# Patient Record
(demographics unavailable — no encounter records)

---

## 2017-06-09 NOTE — ER DOCUMENT REPORT
ED Cardiac





- General


Mode of Arrival: Ambulatory


Information source: Patient


TRAVEL OUTSIDE OF THE [place] IN LAST 30 DAYS: No





- HPI


Patient complains to provider of: Chest pain, Chest tightness


Similar symptoms previously: Yes


Recently seen / treated by doctor: Yes





<CECIL NASH - Last Filed: 17 17:37>





<MARILEEKERENSHAWN - Last Filed: 06/10/17 01:17>





- General


Chief Complaint: Chest Pain


Stated Complaint: CHEST PAIN


Time Seen by Provider: 17 13:53


Notes: 


Patient is a 32 year old female presenting to the emergency department for 

chest pain. Patient's pain was onset yesterday while sitting at her son's 

graduation. Patient states she saw her PCP, Chu Barbour, who gave her ASA, 

Prilosec, and also did an EKG. Patient has an appointment to see a cardiologist 

on Monday. Patient's pain has continued today while she was at work today which 

was onset at 9:00. Patient states she experienced some tightness in her chest 

this morning which was new to . 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 

do any heavy lifting or hard labor. Patient denies any shortness of breath but 

states she has to take deep breaths during the episodes of chest pains. Patient 

states her episodes are waxing and waning and they last about 2-8 minutes; 

patient has had 4 episodes today prior to exam. Patient states she took ASA 

this morning. Patient still has chest pain at the time of the exam. Patient's 

father had an aortic aneurysm and hypertension; patient's mother has diabetes 

mellitus.


 (CECIL NASH)





- Related Data


Allergies/Adverse Reactions: 


 





No Known Allergies Allergy (Verified 17 13:34)


 











Past Medical History





- General


Information source: Patient





- Social History


Smoking Status: Current Every Day Smoker


Chew tobacco use (# tins/day): No


Frequency of alcohol use: None


Drug Abuse: None


Family History: DM - mom, Hypertension, Other - dad: aortic aneurysm rupture at 

age 50


Patient has suicidal ideation: No


Patient has homicidal ideation: No





- Medical History


Medical History: Negative


Past Surgical History: Reports: Hx Abdominal Surgery - laser Sx x3, Hx  

Section, Hx Hysterectomy, Hx Orthopedic Surgery - left wrist, back





<CECIL NASH - Last Filed: 17 17:37>





Review of Systems





- Review of Systems


Constitutional: No symptoms reported


EENT: No symptoms reported


Cardiovascular: See HPI, Chest pain


Respiratory: No symptoms reported


Gastrointestinal: No symptoms reported


Genitourinary: No symptoms reported


Female Genitourinary: No symptoms reported


Musculoskeletal: No symptoms reported


Skin: No symptoms reported


Hematologic/Lymphatic: No symptoms reported


Neurological/Psychological: No symptoms reported


-: Yes All other systems reviewed and negative





<CECIL NASH - Last Filed: 17 17:37>





Physical Exam





<CECIL NASH - Last Filed: 17 17:37>





<SHAWN HARPER - Last Filed: 06/10/17 01:17>





- Vital signs


Vitals: 


 











Temp Pulse Resp BP Pulse Ox


 


 98.0 F   90   16   128/92 H  98 


 


 17 13:34  17 13:34  17 13:34  17 13:34  17 13:34














- Notes


Notes: 


GENERAL: Alert, interacts well. No acute distress.


HEAD: Normocephalic, atraumatic.


EYES: Pupils equal, round, and reactive to light. Extraocular movements intact.


ENT: Oral mucosa moist, tongue midline. 


NECK: Full range of motion. Supple. Trachea midline.


LUNGS: Clear to auscultation bilaterally, no wheezes, rales, or rhonchi.  No 

reproducible chest wall tenderness to palpation.  No respiratory distress.


HEART: Regular rate and rhythm. No murmurs, gallops, or rubs.


ABDOMEN: Soft, non-tender, no epigastric tenderness to palpation. Non-

distended. Bowel sounds present in all 4 quadrants.


EXTREMITIES: Moves all 4 extremities spontaneously. No edema, radial and 

dorsalis pedis pulses 2/4 bilaterally. No cyanosis.


NEUROLOGICAL: Alert and oriented x3. Normal speech. 


PSYCH: Normal affect, normal mood.


SKIN: Warm, dry, normal turgor. No rashes or lesions noted.


 (CECIL NASH)





Course





- Laboratory


Result Diagrams: 


 17 14:20





 17 14:20





<CECIL NASH - Last Filed: 17 17:37>





- Laboratory


Result Diagrams: 


 17 14:20





 17 14:20





<SHAWN HARPER - Last Filed: 06/10/17 01:17>





- Re-evaluation


Re-evalutation: 


17 19:03


CBC shows slight leukocytosis 11.5, d-dimer negative which rules out pulmonary 

embolism however given the sharp and epigastric nature of her pain associated 

with the fact that her father had a ruptured AAA patient was sent for CAT scan 

of the chest abdomen and pelvis, this showed no evidence of pulmonary embolism, 

aortic dissection or aortic aneurysm.  Chemistries unremarkable, troponin 

negative 2.  EKG is non-ischemic.  Pregnancy test is negative.  Chest x-ray 

shows no acute process.  This pain is now resolved.











17 21:17


 (SHAWN HARPER)





- Vital Signs


Vital signs: 


 











Temp Pulse Resp BP Pulse Ox


 


 97.5 F   64   14   110/76   96 


 


 17 21:29  17 21:29  17 21:29  17 21:29  17 21:29














- Laboratory


Laboratory results interpreted by me: 


 











  17





  14:20


 


WBC  11.5 H














- EKG Interpretation by Me


Additional EKG results interpreted by me: 


17 19:04


17 19:04


Initial EKG shows sinus rhythm at a rate of 87, normal intervals, no ST segment 

elevations or depressions, there are isolated nonspecific T-wave inversions 

noted in lead III and V2 per my interpretation.





Repeat EKG shows sinus rhythm at a rate of 69, normal axis, normal intervals, 

no ST segment elevations or depressions, T-wave inversions are persistent in 

lead III but have normalized in V2 per my interpretation.





17 21:19


Final EKG after nitroglycerin with resolution of chest pain does not reveal any 

dynamic changes, reveals sinus rhythm at a rate of 68, persistent T-wave 

inversion in lead III, no ST segment elevations or depressions per my 

interpretation. (SHAWN HARPER)





Discharge





<CEICL NASH - Last Filed: 17 17:37>





<SHAWN HARPER - Last Filed: 06/10/17 01:17>





- Discharge


Clinical Impression: 


 Chest pain, moderate coronary artery risk





Condition: Stable


Disposition: HOME, SELF-CARE


Instructions:  Chest Pain of Unclear Cause ([facility])


Additional Instructions: 


This does not appear to be a heart attack.  You may still have some underlying 

heart disease.  It is very important that you follow-up with your cardiologist 

as scheduled on Monday.





Please continue taking your Prilosec as prescribed.  You may also use Tums.





Please return to the emergency department for any new or concerning symptoms.


Referrals: 


CHU BARBOUR PA-C [Primary Care Provider] - Follow up in 3-5 days


Scribe Attestation: 





06/10/17 01:17


I personally performed the services described in the documentation, reviewed 

and edited the documentation which was dictated to the scribe in my presence, 

and it accurately records my words and actions. (SHAWN HARPER)





Scribe Documentation





- Scribe


Written by Sam:: Sam Aragon, 17 17:50


acting as scribe for :: Rita





<CECIL NASH - Last Filed: 17 17:37>

## 2017-06-09 NOTE — RADIOLOGY REPORT (SQ)
EXAM DESCRIPTION:  CTA ABDOMEN/PELVIS W   WO



COMPLETED DATE/TIME:  6/9/2017 4:12 pm



REASON FOR STUDY:  epigastric pain, r/o AAA



COMPARISON:  None.



TECHNIQUE:  CT scan of the abdominal aorta extending to the iliac bifurcation performed with intraven
ous contrast using helical scanning technique with dynamic intravenous contrast injection. Images rev
iewed with lung, soft tissue, and bone windows. Reconstructed coronal and sagittal MPR images reviewe
d. All images stored on PACS.

Advanced 3D imaging as volume rendering, MIPS, SSD performed? yes

All CT scanners at this facility use dose modulation, iterative reconstruction, and/or weight based d
osing when appropriate to reduce radiation dose to as low as reasonably achievable (ALARA).

[facility]: Dose Right  [facility]: CareDose    [facility]: Dose Right    [facility]: Teradose 4D    [facility]: Smart Technologies



CONTRAST TYPE AND DOSE:  100 Isovue 370



RENAL FUNCTION:  Creatinine 0.72



LIMITATIONS:  Creatinine



FINDINGS:  POST-CONTRAST IMAGING:

AORTA AND VESSELS: No aneurysm. No dissection. Renal arteries, SMA, celiac without stenosis.

LUNG BASES: No significant findings. No nodules or infiltrates.

LIVER: Normal size. No masses or dilated ducts.

SPLEEN: Normal size. No focal lesions.

PANCREAS: No masses. No significant calcifications. No adjacent inflammation or peripancreatic fluid 
collections. Pancreatic duct not dilated.

GALLBLADDER: No identified stones by CT criteria. No inflammatory changes to suggest cholecystitis.

ADRENAL GLANDS: No significant masses or asymmetry.

RIGHT KIDNEY AND URETER: No mass, calculi or urinary tract obstruction.

LEFT KIDNEY AND URETER: No mass, calculi or urinary tract obstruction.

RETROPERITONEUM: No retroperitoneal adenopathy, hemorrhage or masses.

BOWEL AND PERITONEAL CAVITY: No masses or inflammatory changes. No free fluid or peritoneal masses.

APPENDIX: Normal.

ABDOMINAL WALL: No masses. No hernias.

BONY STRUCTURES: No significant or acute findings.

3-D IMAGING: Confirms the above findings.

OTHER: 2.5 cm in diameter right ovarian cyst is identified.  No other significant pelvic abnormalitie
s were identified.



IMPRESSION:  NO ABDOMINAL AORTIC ANEURYSM, DISSECTION OR SIGNIFICANT STENOSIS. NO SIGNIFICANT FINDING
S IN THE ABDOMEN or pelvis.



TECHNICAL DOCUMENTATION:  JOB ID:  [id number]

Quality ID # 436: Final reports with documentation of one or more dose reduction techniques (e.g., Au
tomated exposure control, adjustment of the mA and/or kV according to patient size, use of iterative 
reconstruction technique)

 2011 [facility] [place] [employer]- All Rights Reserved

## 2017-06-09 NOTE — RADIOLOGY REPORT (SQ)
EXAM DESCRIPTION:  CHEST SINGLE VIEW



COMPLETED DATE/TIME:  6/9/2017 2:42 pm



REASON FOR STUDY:  chest pain



COMPARISON:  11/17/2016.



EXAM PARAMETERS:  NUMBER OF VIEWS: One view.

TECHNIQUE: Single frontal radiographic view of the chest acquired.

RADIATION DOSE: NA

LIMITATIONS: None.



FINDINGS:  LUNGS AND PLEURA: No opacities, masses or pneumothorax. No pleural effusion.

MEDIASTINUM AND HILAR STRUCTURES: No masses.  Contour normal.

HEART AND VASCULAR STRUCTURES: Heart normal in size.  Normal vasculature.

BONES: No acute findings.

HARDWARE: None in the chest.

OTHER: No other significant finding.



IMPRESSION:  NO ACUTE RADIOGRAPHIC FINDING IN THE CHEST.



TECHNICAL DOCUMENTATION:  JOB ID:  [id number]

## 2017-06-09 NOTE — ER DOCUMENT REPORT
ED Medical Screen (RME)





- General


Chief Complaint: Chest Pain


Stated Complaint: CHEST PAIN


Time Seen by Provider: 06/09/17 13:53


Notes: 


32-year-old female patient complaining of midsternal chest pain started 

yesterday while sitting at her child's graduation.  Pain was sharp and stabbing

, lasting a couple of minutes and would come and go.  She saw her primary care 

provider and was referred to cardiology.  Today she developed a tight feeling 

and when the pain would hit her throat would also feel tight.  Again the pain 

lasts for a couple of minutes at a time.


Family history significant for father who had MI in his early 50s.  The patient 

is a heavy smoker.





I have greeted and performed a rapid initial assessment of this patient.  A 

comprehensive ED assessment and evaluation of the patient, analysis of test 

results and completion of the medical decision making process will be conducted 

by additional ED providers.


TRAVEL OUTSIDE OF THE [place] IN LAST 30 DAYS: No





- Related Data


Allergies/Adverse Reactions: 


 





No Known Allergies Allergy (Verified 06/09/17 13:34)


 











Past Medical History


Renal/ Medical History: Denies: Hx Peritoneal Dialysis





Physical Exam





- Vital signs


Vitals: 





 











Temp Pulse Resp BP Pulse Ox


 


 98.0 F   90   16   128/92 H  98 


 


 06/09/17 13:34  06/09/17 13:34  06/09/17 13:34  06/09/17 13:34  06/09/17 13:34














Course





- Vital Signs


Vital signs: 





 











Temp Pulse Resp BP Pulse Ox


 


 98.0 F   90   16   128/92 H  98 


 


 06/09/17 13:34  06/09/17 13:34  06/09/17 13:34  06/09/17 13:34  06/09/17 13:34

## 2017-06-09 NOTE — RADIOLOGY REPORT (SQ)
EXAM DESCRIPTION:  CTA CHEST



COMPLETED DATE/TIME:  6/9/2017 4:11 pm



REASON FOR STUDY:  stabbing epigastric pain, r/o AAA



COMPARISON:  None.



TECHNIQUE:  CT scan of the chest performed using helical scanning technique with dynamic intravenous 
contrast injection.  Images reviewed with lung, soft tissue and bone windows.  Reconstructed coronal 
and sagittal MPR images reviewed.

Additional 3 dimensional post-processing performed to develop Maximal Intensity Projection images (MI
P).  All images stored on PACS.

All CT scanners at this facility use dose modulation, iterative reconstruction, and/or weight based d
osing when appropriate to reduce radiation dose to as low as reasonably achievable (ALARA).

[facility]: Dose Right  [facility]: CareDose    [facility]: Dose Right    [facility]: Teradose 4D    [facility]: [employer]



CONTRAST TYPE AND DOSE:  100 Isovue 370



RENAL FUNCTION:  Creatinine 0.72



RADIATION DOSE:  32.65 mGy.



LIMITATIONS:  None.



FINDINGS:  LUNGS AND PLEURA: No masses, infiltrates, pneumothorax.  No pleural effusions, calcificati
ons.

AORTA AND GREAT VESSELS: No aneurysm or dissection.

HEART: No pericardial effusion.

PULMONARY ARTERIES: No emboli visualized in the main pulmonary arteries or the segmental branches.

HILAR AND MEDIASTINAL STRUCTURES: No identified masses or abnormal nodes.

HARDWARE: None in the chest.

UPPER ABDOMEN: See results under abdominal CT scan

THYROID AND OTHER SOFT TISSUES: No masses.  No adenopathy.

BONES: No acute or significant finding.

3D MIPS: Confirm above findings.

OTHER: No other significant finding.



IMPRESSION:  NORMAL CTA OF THE CHEST. NO PULMONARY EMBOLI.  No evidence for a thoracic aortic aneurys
m or dissection.  Other findings as noted above



TECHNICAL DOCUMENTATION:  JOB ID:  [id number]

Quality ID # 436: Final reports with documentation of one or more dose reduction techniques (e.g., Au
tomated exposure control, adjustment of the mA and/or kV according to patient size, use of iterative 
reconstruction technique)

 2011 [employer]- All Rights Reserved

## 2017-06-12 NOTE — EKG REPORT
SEVERITY:- BORDERLINE ECG -

SINUS RHYTHM

PROBABLE LEFT ATRIAL ABNORMALITY

:

Confirmed by: Funmi Saeed 12-Jun-2017 09:54:30

## 2018-02-01 NOTE — RADIOLOGY REPORT (SQ)
EXAM DESCRIPTION:  MRI LUMBAR SPINE COMBO



COMPLETED DATE/TIME:  1/31/2018 7:30 pm



REASON FOR STUDY:  Other intervertebral disc displacement, lumbar region M51.26  OTHER INTERVERTEBRAL
 DISC DISPLACEMENT, LUMBAR REGION



COMPARISON:  MRI lumbar spine 1/6/2016

CT abdomen pelvis 6/9/2017



TECHNIQUE:  Sagittal and Axial imaging includes T1, T1 post gadolinium, T2, STIR and gradient echo se
quences. Coronal T2/HASTE imaging.



CONTRAST TYPE AND DOSE:  15 mL Multihance.



RENAL FUNCTION:  None required. The patient is less than 50 years old.



LIMITATIONS:  None.



FINDINGS:  VISUALIZED UPPER ABDOMEN: Gallstone in the gallbladder axial T2 image 4

SEGMENTATION: No transitional anatomy. The lowest well-developed disc space is labeled L5-S1.

ALIGNMENT: Anatomic.

VERTEBRAE: Intact.  No fractures.

BONE MARROW: Normal. No marrow replacement or reactive changes.

DISC SIGNAL: Decreased T2 weighted intervertebral disc signal at L5-S1

POSTERIOR ELEMENTS:  Post right laminectomy at L5-S1.

HARDWARE: None in the spine.

CORD AND CONUS: Normal in size and signal intensity. Conus at the L2 level.  Incidental finding of a 
fatty filum terminale, an anatomic variant.

SOFT TISSUES: No aortic aneurysm seen. No bulky retroperitoneal adenopathy or mass. No paraspinal mas
s or fluid.

T12-L1:  No significant central or foraminal encroachment

L1-L2: No significant spinal stenosis or exit foraminal stenosis.  Mild bilateral facet hypertrophy

L2-L3: No significant spinal stenosis or exit foraminal stenosis.  Mild bilateral facet hypertrophy

L3-L4: A left foraminal and lateral disc protrusion is present with mild contrast enhancement along i
ts margin, this abuts the left L3 nerve root after it exits the neural foramen.  Mild left L3 foramin
al stenosis.

Elsewhere at L3-4, minimal posterior disc bulging and mild bilateral facet and ligament hypertrophy i
s present without significant central canal or right foraminal narrowing.

L4-L5: No significant spinal stenosis or exit foraminal stenosis.  Minimal posterior disc bulge, mild
 bilateral facet and ligament hypertrophy.

L5-S1: Prior right laminectomy.  There is enhancing granulation tissue along the rightward lateral th
ecal sac and surrounding the right proximal S1 nerve root without mass effect.  Mild contrast enhance
ment along the right paracentral disc margin without recurrent disc herniation.  No significant centr
al canal or foraminal stenosis.

SACRUM: Visualized upper sacrum intact.

ENHANCEMENT: No abnormal conus or nerve root enhancement

OTHER: No other significant findings.



IMPRESSION:  Left foraminal and lateral disc protrusion at L3-4, abutting the left L3 nerve root afte
r it exits the neural foramen

Old right post surgical change L5-S1 without recurrent disc herniation or significant nerve root impi
ngement



TECHNICAL DOCUMENTATION:  JOB ID:  [id number]

 2011 [employer]- All Rights Reserved